# Patient Record
(demographics unavailable — no encounter records)

---

## 2020-01-28 NOTE — NUR
Patient arrives to [facility] [place] [facility] post-procedure for recovery. She is in the bed,
accompanied by HERNAN Beaulieu and RN Tonya Schwartz. Bedside report is received.
She is lying in bed, alert and oriented. She complains that she is cold.
Monitoring applied - VSS on room air. Temporal temp reads 96.8 - given warm
blankets to  her comfort. She denies any pain or nausea. She has a tegaderm
covering her incision on the right chest and the accessed port-a-catheter. The
port is to remain accessed for lab draws today and chemotherapy tomorrow, per
Dr. Reid. The tegaderm has a scant amount of bloody drainage on it close to
the incision area. Call light in reach. Her  is running any errand at
this time.

## 2020-01-28 NOTE — NUR
Patient is sat up in bed. She is offered and receives a soda and a muffin to
eat. She denies any pain, nausea, or need. Drainage on tegaderm remains
unchanged.

## 2020-01-28 NOTE — NUR
Patient has met discharge criteria. Discharge instructions are discussed. She
denies any questions and verbalizes understanding. PIV removed with catheter
intact and hemostasis achieved. She changes to her clothing independently.
Escorted to the exit via wheelchair by staff. Discharged to home with ride at
1120.